# Patient Record
Sex: MALE | Race: WHITE | NOT HISPANIC OR LATINO | Employment: FULL TIME | URBAN - METROPOLITAN AREA
[De-identification: names, ages, dates, MRNs, and addresses within clinical notes are randomized per-mention and may not be internally consistent; named-entity substitution may affect disease eponyms.]

---
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
## 2022-08-20 ENCOUNTER — HEALTH MAINTENANCE LETTER (OUTPATIENT)
Age: 22
End: 2022-08-20

## 2022-11-20 ENCOUNTER — HEALTH MAINTENANCE LETTER (OUTPATIENT)
Age: 22
End: 2022-11-20

## 2023-09-16 ENCOUNTER — HEALTH MAINTENANCE LETTER (OUTPATIENT)
Age: 23
End: 2023-09-16

## 2024-08-24 ENCOUNTER — OFFICE VISIT (OUTPATIENT)
Dept: URGENT CARE | Facility: URGENT CARE | Age: 24
End: 2024-08-24
Payer: COMMERCIAL

## 2024-08-24 VITALS
TEMPERATURE: 98.1 F | SYSTOLIC BLOOD PRESSURE: 113 MMHG | WEIGHT: 186 LBS | DIASTOLIC BLOOD PRESSURE: 76 MMHG | OXYGEN SATURATION: 98 % | HEART RATE: 72 BPM

## 2024-08-24 DIAGNOSIS — R11.2 NAUSEA AND VOMITING, UNSPECIFIED VOMITING TYPE: Primary | ICD-10-CM

## 2024-08-24 PROCEDURE — 99203 OFFICE O/P NEW LOW 30 MIN: CPT | Performed by: PHYSICIAN ASSISTANT

## 2024-08-24 NOTE — PROGRESS NOTES
"Assessment & Plan       1. Nausea and vomiting, unspecified vomiting type    Increase fluids. Monitor symptoms. Follow up in clinic if symptoms persisting over the next several days.                 LANA Reed [facility] [place] is a 24 year old male who presents to clinic today for the following health issues:  Chief Complaint   Patient presents with    Vomiting     Once today, upset stomach, some blood from throat possibly, drinking fluids today, had \"too much to drink\" last night       HPI    Here for vomiting once 1.5 hrs ago. Did not last very long. Initially nausea which led to the vomiting. Afterward drank some gatorade which stung his throat. Spits into the sink showing some blood at times and still having some bloody mucus even now. Throat pretty irritated. He did have about 8 alcohol drinks which led to symptoms. He does not drink on weekdays just on the weekends. Nausea is improved.           Review of Systems        Objective    /76 (BP Location: Right arm, Patient Position: Sitting, Cuff Size: Adult Large)   Pulse 72   Temp 98.1  F (36.7  C) (Oral)   Wt 84.4 kg (186 lb)   SpO2 98%   Physical Exam  HENT:      Mouth/Throat:      Mouth: Mucous membranes are moist.      Pharynx: Oropharynx is clear. Uvula midline.   Abdominal:      General: Abdomen is flat.      Palpations: Abdomen is soft.      Tenderness: There is no abdominal tenderness.                    "

## 2024-11-09 ENCOUNTER — HEALTH MAINTENANCE LETTER (OUTPATIENT)
Age: 24
End: 2024-11-09

## 2025-02-15 ENCOUNTER — HOSPITAL ENCOUNTER (EMERGENCY)
Facility: CLINIC | Age: 25
Discharge: HOME | End: 2025-02-15
Attending: EMERGENCY MEDICINE | Admitting: EMERGENCY MEDICINE
Payer: COMMERCIAL

## 2025-02-15 VITALS
HEART RATE: 107 BPM | OXYGEN SATURATION: 96 % | RESPIRATION RATE: 16 BRPM | TEMPERATURE: 99.5 F | DIASTOLIC BLOOD PRESSURE: 72 MMHG | SYSTOLIC BLOOD PRESSURE: 127 MMHG

## 2025-02-15 DIAGNOSIS — J11.1 INFLUENZA-LIKE ILLNESS: ICD-10-CM

## 2025-02-15 LAB
FLUAV RNA SPEC QL NAA+PROBE: NEGATIVE
FLUBV RNA RESP QL NAA+PROBE: NEGATIVE
HOLD SPECIMEN: NORMAL
HOLD SPECIMEN: NORMAL
RSV RNA SPEC NAA+PROBE: NEGATIVE
SARS-COV-2 RNA RESP QL NAA+PROBE: NEGATIVE

## 2025-02-15 PROCEDURE — 96360 HYDRATION IV INFUSION INIT: CPT

## 2025-02-15 PROCEDURE — 99283 EMERGENCY DEPT VISIT LOW MDM: CPT | Mod: 25

## 2025-02-15 PROCEDURE — 258N000003 HC RX IP 258 OP 636: Performed by: EMERGENCY MEDICINE

## 2025-02-15 PROCEDURE — 87637 SARSCOV2&INF A&B&RSV AMP PRB: CPT | Performed by: EMERGENCY MEDICINE

## 2025-02-15 RX ADMIN — SODIUM CHLORIDE 1000 ML: 9 INJECTION, SOLUTION INTRAVENOUS at 11:27

## 2025-02-15 ASSESSMENT — ACTIVITIES OF DAILY LIVING (ADL)
ADLS_ACUITY_SCORE: 41

## 2025-02-15 ASSESSMENT — COLUMBIA-SUICIDE SEVERITY RATING SCALE - C-SSRS
6. HAVE YOU EVER DONE ANYTHING, STARTED TO DO ANYTHING, OR PREPARED TO DO ANYTHING TO END YOUR LIFE?: NO
2. HAVE YOU ACTUALLY HAD ANY THOUGHTS OF KILLING YOURSELF IN THE PAST MONTH?: NO
1. IN THE PAST MONTH, HAVE YOU WISHED YOU WERE DEAD OR WISHED YOU COULD GO TO SLEEP AND NOT WAKE UP?: NO

## 2025-02-15 NOTE — ED PROVIDER NOTES
Emergency Department Note      History of Present Illness     Chief Complaint   Cough and Fever      HPI   Jesse Dasilva is a 24 year old male who is otherwise healthy presenting to the ED with fever and cough. The patient reports that 3-4 days ago, he developed fever, chills, nausea, body aches, cough, headache, and lightheadedness. He feels dehydrated with dark urine despite drinking water, Pedialyte, and Gatorade. He went to [facility] yesterday and was given Augmentin for an ear infection. He was swabbed for flu and Covid but has not been called back. He is concerned for pneumonia, as he has had this in the past. He denies vomiting or diarrhea. No history of asthma or breathing problems.    Independent Historian   None    Review of External Notes   Reviewed urgent care record from yesterday.     Past Medical History     Medical History and Problem List   Appendicitis     Medications   The patient denies current use of prescribed medication.     Surgical History   Appendectomy     Physical Exam     Patient Vitals for the past 24 hrs:   BP Temp Temp src Pulse Resp SpO2   02/15/25 0932 127/72 99.5  F (37.5  C) Oral 107 16 96 %     Physical Exam  Nursing note and vitals reviewed.  HENT:   Mouth/Throat: Moist mucous membranes.   TMs normal bilaterally.   Eyes: EOMI, nonicteric sclera  Cardiovascular: mild tachycardia, regular rhythm, no murmur  Pulmonary/Chest: Effort normal and breath sounds normal. No respiratory distress. No wheezes. No rales.   Musculoskeletal: Normal range of motion.   Neurological: Alert. Moves all extremities spontaneously.   Skin: Skin is warm and dry. No rash noted.         Diagnostics     Lab Results   Labs Ordered and Resulted from Time of ED Arrival to Time of ED Departure   INFLUENZA A/B, RSV AND SARS-COV2 PCR - Normal       Result Value    Influenza A PCR Negative      Influenza B PCR Negative      RSV PCR Negative      SARS CoV2 PCR Negative         Imaging   No orders to  display     Independent Interpretation   None    ED Course      Medications Administered   Medications   sodium chloride 0.9% BOLUS 1,000 mL (0 mLs Intravenous Stopped 2/15/25 1202)       Procedures   None     Discussion of Management   None    ED Course   ED Course as of 02/15/25 1300   Sat Feb 15, 2025   0945 I evaluated the patient and obtained history.        Additional Documentation  None    Medical Decision Making / Diagnosis     CMS Diagnoses: None    MIPS       None    [facility]   Jesse Dasilva is a 24 year old male who presents with fever, cough, feelings of dehydration.  Patient was seen in  urgent care yesterday and diagnosed with otitis media and started on antibiotics.  Presentation today is consistent with viral upper respiratory infection.  There are 0 signs of otitis media.  Quad panel negative.  His lungs are clear.  Chest x-ray is not indicated.  He and especially his parents are quite insistent on receiving IV fluids.  Patient is not vomiting, but he is mildly tachycardic.  1 L IV fluids given.  Patient then asked nursing staff to remove IV and he eloped without further discussion.  He was in stable condition at time of elopement.    Disposition   The patient was discharged.     Diagnosis     ICD-10-CM    1. Influenza-like illness  J11.1            Scribe Disclosure:  Roxana MATTHEWS, am serving as a scribe at 10:14 AM on 2/15/2025 to document services personally performed by Rigoberto Chong MD based on my observations and the provider's statements to me.        Rigoberto Chong MD  02/18/25 0057

## 2025-02-15 NOTE — ED TRIAGE NOTES
Pt c/o fever, chills, cough, and nausea since yesterday. Pt sen at  yesterday ands ent home with Augmentin. Pt took Augmentin and advil at 0830 this morning.      Triage Assessment (Adult)       Row Name 02/15/25 0934          Triage Assessment    Airway WDL WDL        Respiratory WDL    Respiratory WDL WDL        Cardiac WDL    Cardiac WDL WDL        Cognitive/Neuro/Behavioral WDL    Cognitive/Neuro/Behavioral WDL WDL